# Patient Record
(demographics unavailable — no encounter records)

---

## 2025-07-03 NOTE — HISTORY OF PRESENT ILLNESS
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    PMH of RCC s/p R nephrectomy and immunotherapy, ESRD on HD MWF, HTN, T2DM and HLD with recent admission for suspected sepsis.  Work up revealed low AM cortisol of 5.7.  Previous admission in April 2025, his cortisol was only 2.4.  ACTH was low at 4.8.    He was started on empiric hydrocortisone with drastic improvement in symptoms and BP.   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     [place] [facility] has been stopped.     He has history of T2DM.  Treated with Ozempic and weaned off after weight loss and improved glycemic control.  A1c in hospital was 5.5%.   Current Regimen: HC 10 mg in AM and 5 mg in PM.     He complains of weight gain and some edema in his feet.   He denies anorexia, fatigue or nausea.

## 2025-07-03 NOTE — PHYSICAL EXAM
[Healthy Appearance] : healthy appearance [No Acute Distress] : no acute distress [Normal Sclera/Conjunctiva] : normal sclera/conjunctiva [No Proptosis] : no proptosis [No Neck Mass] : no neck mass was observed [No LAD] : no lymphadenopathy [Supple] : the neck was supple [Thyroid Not Enlarged] : the thyroid was not enlarged [No Thyroid Nodules] : no palpable thyroid nodules [No Respiratory Distress] : no respiratory distress [Clear to Auscultation] : lungs were clear to auscultation bilaterally [Normal S1, S2] : normal S1 and S2 [No Murmurs] : no murmurs [Normal Rate] : heart rate was normal [Regular Rhythm] : with a regular rhythm [Normal Affect] : the affect was normal [Normal Insight/Judgement] : insight and judgment were intact [Normal Mood] : the mood was normal [Acanthosis Nigricans] : no acanthosis nigricans [de-identified] : 1 + b/l LE edema

## 2025-07-03 NOTE — ASSESSMENT
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    1.  Central AI: -  AM cortisol < 3 and low ACTH drawn as inpatient is highly suspicious of central AI. -  To confirm diagnosis, will repeat early morning cortisol and ACTH after holding afternoon and morning dose of hydrocortisone.  If levels are questionable, can proceed with in office cosyntropin stim test.  -  For now, continue hydrocortisone 10 mg in AM and 5 mg in PM. -  Reviewed sick day rules for dosing HC under stress.  -  I suspect this process is related to immunotherapy. -  Will order remainder of pituitary function panel to rule out hypopituitarism.   2.  Pituitary cyst: -  this is an incidental finding and not related to current presentation.   -  Will order hormonal profile now.     3.  T2DM: -  well controlled. -  no need for pharmacologic therapy at this time.    Spent 40 minuties on this encounter with time spent reviewing records and counseling the patient.

## 2025-07-03 NOTE — REVIEW OF SYSTEMS
[Recent Weight Gain (___ Lbs)] : recent weight gain: [unfilled] lbs [Diarrhea] : diarrhea [Fatigue] : no fatigue [Decreased Appetite] : appetite not decreased [Nausea] : no nausea

## 2025-07-03 NOTE — CONSULT LETTER
[Dear  ___] : Dear  [unfilled], [Consult Letter:] : I had the pleasure of evaluating your patient, [unfilled]. [Please see my note below.] : Please see my note below. [Consult Closing:] : Thank you very much for allowing me to participate in the care of this patient.  If you have any questions, please do not hesitate to contact me. [Sincerely,] : Sincerely, [FreeTextEntry3] : Francisco Javier Lopez MD, FACE Chief, Div. of Endocrinology, [facility], [facility] , [employer] [facility]